# Patient Record
Sex: MALE | Race: WHITE | NOT HISPANIC OR LATINO
[De-identification: names, ages, dates, MRNs, and addresses within clinical notes are randomized per-mention and may not be internally consistent; named-entity substitution may affect disease eponyms.]

---

## 2018-08-16 ENCOUNTER — APPOINTMENT (OUTPATIENT)
Dept: ULTRASOUND IMAGING | Facility: CLINIC | Age: 23
End: 2018-08-16

## 2018-08-22 ENCOUNTER — OUTPATIENT (OUTPATIENT)
Dept: OUTPATIENT SERVICES | Facility: HOSPITAL | Age: 23
LOS: 1 days | End: 2018-08-22
Payer: COMMERCIAL

## 2018-08-22 ENCOUNTER — APPOINTMENT (OUTPATIENT)
Dept: ULTRASOUND IMAGING | Facility: CLINIC | Age: 23
End: 2018-08-22

## 2018-08-22 DIAGNOSIS — Z00.8 ENCOUNTER FOR OTHER GENERAL EXAMINATION: ICD-10-CM

## 2018-08-22 PROCEDURE — 76700 US EXAM ABDOM COMPLETE: CPT | Mod: 26

## 2018-08-22 PROCEDURE — 76700 US EXAM ABDOM COMPLETE: CPT

## 2020-05-16 ENCOUNTER — TRANSCRIPTION ENCOUNTER (OUTPATIENT)
Age: 25
End: 2020-05-16

## 2020-05-17 ENCOUNTER — EMERGENCY (EMERGENCY)
Facility: HOSPITAL | Age: 25
LOS: 1 days | Discharge: HOME | End: 2020-05-17
Attending: EMERGENCY MEDICINE | Admitting: EMERGENCY MEDICINE
Payer: COMMERCIAL

## 2020-05-17 VITALS
HEIGHT: 68 IN | DIASTOLIC BLOOD PRESSURE: 67 MMHG | OXYGEN SATURATION: 99 % | HEART RATE: 62 BPM | TEMPERATURE: 97 F | WEIGHT: 169.98 LBS | RESPIRATION RATE: 14 BRPM | SYSTOLIC BLOOD PRESSURE: 123 MMHG

## 2020-05-17 PROCEDURE — 13151 CMPLX RPR E/N/E/L 1.1-2.5 CM: CPT

## 2020-05-17 PROCEDURE — 99283 EMERGENCY DEPT VISIT LOW MDM: CPT

## 2020-05-17 PROCEDURE — 90471 IMMUNIZATION ADMIN: CPT

## 2020-05-17 PROCEDURE — 99285 EMERGENCY DEPT VISIT HI MDM: CPT | Mod: 25

## 2020-05-17 PROCEDURE — 90715 TDAP VACCINE 7 YRS/> IM: CPT

## 2020-05-17 RX ORDER — TETANUS TOXOID, REDUCED DIPHTHERIA TOXOID AND ACELLULAR PERTUSSIS VACCINE, ADSORBED 5; 2.5; 8; 8; 2.5 [IU]/.5ML; [IU]/.5ML; UG/.5ML; UG/.5ML; UG/.5ML
0.5 SUSPENSION INTRAMUSCULAR ONCE
Refills: 0 | Status: COMPLETED | OUTPATIENT
Start: 2020-05-17 | End: 2020-05-17

## 2020-05-17 RX ADMIN — TETANUS TOXOID, REDUCED DIPHTHERIA TOXOID AND ACELLULAR PERTUSSIS VACCINE, ADSORBED 0.5 MILLILITER(S): 5; 2.5; 8; 8; 2.5 SUSPENSION INTRAMUSCULAR at 14:15

## 2020-05-17 NOTE — ED PROVIDER NOTE - OBJECTIVE STATEMENT
23 y/o M presents with c/o R eyebrow laceration x 1 hour. Pt states that he was working on a car and a steel part hit his eyebrow and sustained laceration. States that he is UTD with tetanus. Denies LOC, headache, n/v, eye injury, FB or other symptoms. 23 y/o M presents with c/o R eyebrow laceration x 1 hour. Pt states that he was working on a car and the crowbar hit his eyebrow and sustained laceration. States that he is UTD with tetanus. Denies LOC, headache, n/v, eye injury, FB or other symptoms. Requesting for plastic surgeon for lac repair.

## 2020-05-17 NOTE — ED PROVIDER NOTE - PROGRESS NOTE DETAILS
Spoke to plastic surgeon, Dr. Whatley, will see pt in ED shortly for lac repair/consult. Yahaira PORTER for ED attending, Dr. Bundy : 24 YOM working on car w/ crowbar. Accidently hit R medial eyebrow 2 hours ago. No other complaints. No acute distress. A&Ox3, PERRL, R eyebrow w/ 2.5 cm laceration. Pt aware he will receive additional bill from plastic surgeon for his service. Laceration repaired by Dr. Whatley in ED, pt tolerated well. NAD. Cleared for dc home, fu office in 1 week, tdap given to pt as requested by Dr. Whatley.

## 2020-05-17 NOTE — ED PROVIDER NOTE - CARE PROVIDER_API CALL
Tayo Whatley  PLASTIC SURGERY  [address] [place] [phone]  Phone: [phone]  Fax: [phone]  Follow Up Time:
DC instructions

## 2020-05-17 NOTE — ED PROVIDER NOTE - NS_ ATTENDINGSCRIBEDETAILS _ED_A_ED_FT
Joe Bundy MD - The scribe's documentation has been prepared under my direction and personally reviewed by me in its entirety. I confirm that the note above accurately reflects all work, treatment, procedures, and medical decision making performed by me.

## 2020-05-17 NOTE — ED PROVIDER NOTE - PATIENT PORTAL LINK FT
You can access the FollowMyHealth Patient Portal offered by [facility] by registering at the following website: [email or web address]. By joining [employer]’s FollowMyHealth portal, you will also be able to view your health information using other applications (apps) compatible with our system.

## 2020-05-17 NOTE — ED PROVIDER NOTE - CLINICAL SUMMARY MEDICAL DECISION MAKING FREE TEXT BOX
25 y/o M co R eyebrow laceration with crowbar while working on car today, +2.5cm laceration noted to medial eyebrow, no other injuries, utd with tetanus, pt requested for plastics for lac repair, aware of additional bills from plastic surgeon, spoke to plastics on-call, Dr. Whatley, will repair lac and dc

## 2020-05-17 NOTE — ED PROVIDER NOTE - NSFOLLOWUPINSTRUCTIONS_ED_ALL_ED_FT
Follow up with plastic surgeon in 1 week with appointment for re-evaluation, ongoing care and treatment. Wound care as instructed by plastic surgeon. If having worsening of symptoms, wound redness/streaking/drainage, RETURN TO THE ER IMMEDIATELY.

## 2022-04-28 ENCOUNTER — APPOINTMENT (OUTPATIENT)
Dept: ORTHOPEDIC SURGERY | Facility: CLINIC | Age: 27
End: 2022-04-28
Payer: COMMERCIAL

## 2022-04-28 DIAGNOSIS — S33.5XXA SPRAIN OF LIGAMENTS OF LUMBAR SPINE, INITIAL ENCOUNTER: ICD-10-CM

## 2022-04-28 PROCEDURE — 72100 X-RAY EXAM L-S SPINE 2/3 VWS: CPT

## 2022-04-28 PROCEDURE — 99203 OFFICE O/P NEW LOW 30 MIN: CPT

## 2022-04-28 RX ORDER — METHYLPREDNISOLONE 4 MG/1
4 TABLET ORAL
Qty: 1 | Refills: 1 | Status: ACTIVE | COMMUNITY
Start: 2022-04-28 | End: 1900-01-01

## 2022-04-28 NOTE — PHYSICAL EXAM
[Normal Mood and Affect] : normal mood and affect [Orientated] : orientated [Able to Communicate] : able to communicate [Well Developed] : well developed [Well Nourished] : well nourished [NL (30)] : right lateral bending 30 degrees [Flexion] : flexion [Bending to right] : bending to right [No bony abnormalities] : No bony abnormalities [] : non-antalgic [TWNoteComboBox7] : forward flexion 75 degrees

## 2022-04-28 NOTE — HISTORY OF PRESENT ILLNESS
[Mid-back] : mid-back [Gradual] : gradual [8] : 8 [4] : 4 [Burning] : burning [Shooting] : shooting [Throbbing] : throbbing [Tightness] : tightness [Intermittent] : intermittent [Exercising] : exercising [Full time] : Work status: full time [de-identified] : Initial visit for this 26 year male  who played golf x 5 days ago, woke up next evening c/o acute  rt sided LBP. No leg pain. pain was a "10" and now a "5". Tried motrin 400-600mg prn w/ some relief. Worse bending and sitting.\par PMH: had some similar episodes within x last 1 month, got better on its own.\par  [] : Post Surgical Visit: no [FreeTextEntry5] : Pt has been having back pain for the last few months and has a flare up of back pain, pt had to call out of work a few days ago from back pain specifically on his right side  [de-identified] : reaching  [de-identified] : None  [de-identified] : MAGUI juarezic

## 2022-06-28 DIAGNOSIS — M54.2 CERVICALGIA: ICD-10-CM

## 2024-08-14 ENCOUNTER — NON-APPOINTMENT (OUTPATIENT)
Age: 29
End: 2024-08-14